# Patient Record
Sex: FEMALE | Race: BLACK OR AFRICAN AMERICAN | NOT HISPANIC OR LATINO | Employment: FULL TIME | URBAN - METROPOLITAN AREA
[De-identification: names, ages, dates, MRNs, and addresses within clinical notes are randomized per-mention and may not be internally consistent; named-entity substitution may affect disease eponyms.]

---

## 2023-02-01 PROBLEM — E55.9 VITAMIN D DEFICIENCY: Status: ACTIVE | Noted: 2023-02-01

## 2023-02-01 PROBLEM — E87.6 LOW SERUM POTASSIUM: Status: ACTIVE | Noted: 2023-02-01

## 2023-02-01 PROBLEM — R92.8 ABNORMAL MAMMOGRAM: Status: ACTIVE | Noted: 2023-02-01

## 2023-02-01 PROBLEM — E66.812 CLASS 2 SEVERE OBESITY WITH SERIOUS COMORBIDITY AND BODY MASS INDEX (BMI) OF 35.0 TO 35.9 IN ADULT: Status: ACTIVE | Noted: 2023-02-01

## 2023-02-01 PROBLEM — E66.01 CLASS 2 SEVERE OBESITY WITH SERIOUS COMORBIDITY AND BODY MASS INDEX (BMI) OF 35.0 TO 35.9 IN ADULT (MULTI): Status: ACTIVE | Noted: 2023-02-01

## 2023-02-01 PROBLEM — R63.8 INCREASED BMI (BODY MASS INDEX): Status: ACTIVE | Noted: 2023-02-01

## 2023-02-01 PROBLEM — J02.0 PHARYNGITIS, STREPTOCOCCAL: Status: ACTIVE | Noted: 2023-02-01

## 2023-02-01 PROBLEM — E04.9 THYROID ENLARGED: Status: ACTIVE | Noted: 2023-02-01

## 2023-02-01 PROBLEM — E88.810 METABOLIC SYNDROME: Status: ACTIVE | Noted: 2023-02-01

## 2023-02-01 PROBLEM — M67.439 GANGLION CYST OF WRIST: Status: ACTIVE | Noted: 2023-02-01

## 2023-02-01 PROBLEM — N97.9 FEMALE INFERTILITY: Status: ACTIVE | Noted: 2023-02-01

## 2023-02-01 PROBLEM — N91.2 AMENORRHEA: Status: ACTIVE | Noted: 2023-02-01

## 2023-02-01 PROBLEM — I10 BENIGN ESSENTIAL HTN: Status: ACTIVE | Noted: 2023-02-01

## 2023-02-01 PROBLEM — R53.83 FATIGUE: Status: ACTIVE | Noted: 2023-02-01

## 2023-02-01 PROBLEM — R03.0 BLOOD PRESSURE ELEVATED WITHOUT HISTORY OF HTN: Status: ACTIVE | Noted: 2023-02-01

## 2023-02-01 RX ORDER — BETAMETHASONE DIPROPIONATE 0.5 MG/G
OINTMENT TOPICAL
COMMUNITY
Start: 2021-10-25 | End: 2024-03-22 | Stop reason: ALTCHOICE

## 2023-02-01 RX ORDER — AMLODIPINE BESYLATE 2.5 MG/1
1 TABLET ORAL DAILY
COMMUNITY
Start: 2022-10-18 | End: 2023-05-02 | Stop reason: SDUPTHER

## 2023-02-01 RX ORDER — CHOLECALCIFEROL (VITAMIN D3) 25 MCG
TABLET ORAL
COMMUNITY
Start: 2022-04-20

## 2023-02-01 RX ORDER — BIOTIN 10 MG
TABLET ORAL
COMMUNITY

## 2023-03-15 ENCOUNTER — OFFICE VISIT (OUTPATIENT)
Dept: PRIMARY CARE | Facility: CLINIC | Age: 43
End: 2023-03-15
Payer: COMMERCIAL

## 2023-03-15 ENCOUNTER — LAB (OUTPATIENT)
Dept: LAB | Facility: LAB | Age: 43
End: 2023-03-15
Payer: COMMERCIAL

## 2023-03-15 VITALS
SYSTOLIC BLOOD PRESSURE: 136 MMHG | BODY MASS INDEX: 36.49 KG/M2 | HEART RATE: 83 BPM | OXYGEN SATURATION: 100 % | RESPIRATION RATE: 18 BRPM | WEIGHT: 206 LBS | DIASTOLIC BLOOD PRESSURE: 86 MMHG

## 2023-03-15 DIAGNOSIS — Z00.00 ANNUAL PHYSICAL EXAM: ICD-10-CM

## 2023-03-15 DIAGNOSIS — E87.6 POTASSIUM (K) DEFICIENCY: ICD-10-CM

## 2023-03-15 DIAGNOSIS — E66.9 OBESITY (BMI 35.0-39.9 WITHOUT COMORBIDITY): ICD-10-CM

## 2023-03-15 DIAGNOSIS — Z00.00 ANNUAL PHYSICAL EXAM: Primary | ICD-10-CM

## 2023-03-15 LAB
ANION GAP IN SER/PLAS: 11 MMOL/L (ref 10–20)
CALCIUM (MG/DL) IN SER/PLAS: 8.9 MG/DL (ref 8.6–10.3)
CARBON DIOXIDE, TOTAL (MMOL/L) IN SER/PLAS: 25 MMOL/L (ref 21–32)
CHLORIDE (MMOL/L) IN SER/PLAS: 104 MMOL/L (ref 98–107)
CREATININE (MG/DL) IN SER/PLAS: 0.91 MG/DL (ref 0.5–1.05)
GFR FEMALE: 79 ML/MIN/1.73M2
GLUCOSE (MG/DL) IN SER/PLAS: 82 MG/DL (ref 74–99)
POTASSIUM (MMOL/L) IN SER/PLAS: 4.4 MMOL/L (ref 3.5–5.3)
SODIUM (MMOL/L) IN SER/PLAS: 136 MMOL/L (ref 136–145)
THYROTROPIN (MIU/L) IN SER/PLAS BY DETECTION LIMIT <= 0.05 MIU/L: 0.93 MIU/L (ref 0.44–3.98)
UREA NITROGEN (MG/DL) IN SER/PLAS: 12 MG/DL (ref 6–23)

## 2023-03-15 PROCEDURE — 99213 OFFICE O/P EST LOW 20 MIN: CPT | Performed by: INTERNAL MEDICINE

## 2023-03-15 PROCEDURE — 80048 BASIC METABOLIC PNL TOTAL CA: CPT

## 2023-03-15 PROCEDURE — 84443 ASSAY THYROID STIM HORMONE: CPT

## 2023-03-15 PROCEDURE — 3075F SYST BP GE 130 - 139MM HG: CPT | Performed by: INTERNAL MEDICINE

## 2023-03-15 PROCEDURE — 3079F DIAST BP 80-89 MM HG: CPT | Performed by: INTERNAL MEDICINE

## 2023-03-15 PROCEDURE — 36415 COLL VENOUS BLD VENIPUNCTURE: CPT

## 2023-03-15 ASSESSMENT — ENCOUNTER SYMPTOMS
ALLERGIC/IMMUNOLOGIC NEGATIVE: 1
NEUROLOGICAL NEGATIVE: 1
PSYCHIATRIC NEGATIVE: 1
EYES NEGATIVE: 1
HEMATOLOGIC/LYMPHATIC NEGATIVE: 1
CONSTITUTIONAL NEGATIVE: 1
RESPIRATORY NEGATIVE: 1
ENDOCRINE NEGATIVE: 1
CARDIOVASCULAR NEGATIVE: 1
GASTROINTESTINAL NEGATIVE: 1
MUSCULOSKELETAL NEGATIVE: 1

## 2023-03-15 NOTE — PROGRESS NOTES
Subjective   Patient ID: Soheila Hassan is a 42 y.o. female who presents for  Elevated BMI -    BMI=36    Doing Well            HPI     Review of Systems   Constitutional: Negative.    HENT: Negative.     Eyes: Negative.    Respiratory: Negative.     Cardiovascular: Negative.    Gastrointestinal: Negative.    Endocrine: Negative.    Genitourinary: Negative.    Musculoskeletal: Negative.    Skin: Negative.    Allergic/Immunologic: Negative.    Neurological: Negative.    Hematological: Negative.    Psychiatric/Behavioral: Negative.         Objective   Pulse 83   Resp 18   Wt 93.4 kg (206 lb)   SpO2 100%   BMI 36.49 kg/m²     Physical Exam  Constitutional:       Appearance: Normal appearance.   HENT:      Head: Normocephalic and atraumatic.      Right Ear: Tympanic membrane, ear canal and external ear normal.      Left Ear: Tympanic membrane, ear canal and external ear normal.      Nose: Nose normal.      Mouth/Throat:      Mouth: Mucous membranes are moist.   Eyes:      Extraocular Movements: Extraocular movements intact.      Conjunctiva/sclera: Conjunctivae normal.      Pupils: Pupils are equal, round, and reactive to light.   Cardiovascular:      Rate and Rhythm: Normal rate and regular rhythm.      Pulses: Normal pulses.      Heart sounds: Normal heart sounds.   Pulmonary:      Effort: Pulmonary effort is normal.      Breath sounds: Normal breath sounds.   Abdominal:      General: Abdomen is flat. Bowel sounds are normal.      Palpations: Abdomen is soft.   Genitourinary:     Rectum: Normal.   Musculoskeletal:         General: Normal range of motion.      Cervical back: Normal range of motion.   Skin:     General: Skin is warm.   Neurological:      General: No focal deficit present.      Mental Status: She is alert and oriented to person, place, and time.   Psychiatric:         Mood and Affect: Mood normal.         Behavior: Behavior normal.         Assessment/Plan         Tighten diet/ Increased physical  activity    Recommend Endocrinology consult for obesity management    HTN    MARIA LUISA < 2000 MG/day    Continue with Amlodipine 2.5 mg daily    Continue with Current treatment    Follow up in 3 months/PRN

## 2023-05-02 DIAGNOSIS — I10 PRIMARY HYPERTENSION: Primary | ICD-10-CM

## 2023-05-02 RX ORDER — AMLODIPINE BESYLATE 2.5 MG/1
2.5 TABLET ORAL DAILY
Qty: 90 TABLET | Refills: 3 | Status: SHIPPED | OUTPATIENT
Start: 2023-05-02 | End: 2023-06-22 | Stop reason: DRUGHIGH

## 2023-06-22 ENCOUNTER — OFFICE VISIT (OUTPATIENT)
Dept: PRIMARY CARE | Facility: CLINIC | Age: 43
End: 2023-06-22
Payer: COMMERCIAL

## 2023-06-22 VITALS
RESPIRATION RATE: 18 BRPM | OXYGEN SATURATION: 99 % | HEART RATE: 73 BPM | WEIGHT: 207.6 LBS | DIASTOLIC BLOOD PRESSURE: 83 MMHG | SYSTOLIC BLOOD PRESSURE: 140 MMHG | BODY MASS INDEX: 36.77 KG/M2

## 2023-06-22 DIAGNOSIS — I10 BENIGN ESSENTIAL HTN: Primary | ICD-10-CM

## 2023-06-22 PROCEDURE — 3079F DIAST BP 80-89 MM HG: CPT | Performed by: INTERNAL MEDICINE

## 2023-06-22 PROCEDURE — 3077F SYST BP >= 140 MM HG: CPT | Performed by: INTERNAL MEDICINE

## 2023-06-22 PROCEDURE — 99213 OFFICE O/P EST LOW 20 MIN: CPT | Performed by: INTERNAL MEDICINE

## 2023-06-22 PROCEDURE — 1036F TOBACCO NON-USER: CPT | Performed by: INTERNAL MEDICINE

## 2023-06-22 RX ORDER — POTASSIUM CHLORIDE 750 MG/1
1 CAPSULE, EXTENDED RELEASE ORAL DAILY
COMMUNITY
Start: 2022-08-23 | End: 2023-06-22 | Stop reason: ALTCHOICE

## 2023-06-22 RX ORDER — AMLODIPINE BESYLATE 5 MG/1
5 TABLET ORAL DAILY
Qty: 90 TABLET | Refills: 3 | Status: SHIPPED | OUTPATIENT
Start: 2023-06-22 | End: 2024-03-22 | Stop reason: SDUPTHER

## 2023-06-22 RX ORDER — TOPIRAMATE 100 MG/1
100 TABLET, FILM COATED ORAL 2 TIMES DAILY
COMMUNITY
Start: 2023-06-14 | End: 2024-03-22 | Stop reason: ALTCHOICE

## 2023-06-22 ASSESSMENT — PATIENT HEALTH QUESTIONNAIRE - PHQ9
1. LITTLE INTEREST OR PLEASURE IN DOING THINGS: NOT AT ALL
SUM OF ALL RESPONSES TO PHQ9 QUESTIONS 1 AND 2: 0
2. FEELING DOWN, DEPRESSED OR HOPELESS: NOT AT ALL

## 2023-06-22 ASSESSMENT — ENCOUNTER SYMPTOMS
DEPRESSION: 0
LOSS OF SENSATION IN FEET: 0
OCCASIONAL FEELINGS OF UNSTEADINESS: 0

## 2023-09-25 ENCOUNTER — APPOINTMENT (OUTPATIENT)
Dept: PRIMARY CARE | Facility: CLINIC | Age: 43
End: 2023-09-25
Payer: COMMERCIAL

## 2023-09-25 RX ORDER — CHLORTHALIDONE 25 MG/1
1 TABLET ORAL DAILY
COMMUNITY
Start: 2022-09-28 | End: 2024-03-22 | Stop reason: ALTCHOICE

## 2023-09-27 ENCOUNTER — OFFICE VISIT (OUTPATIENT)
Dept: PRIMARY CARE | Facility: CLINIC | Age: 43
End: 2023-09-27
Payer: COMMERCIAL

## 2023-09-27 VITALS
HEIGHT: 63 IN | SYSTOLIC BLOOD PRESSURE: 126 MMHG | RESPIRATION RATE: 16 BRPM | OXYGEN SATURATION: 99 % | BODY MASS INDEX: 35.97 KG/M2 | WEIGHT: 203 LBS | HEART RATE: 82 BPM | DIASTOLIC BLOOD PRESSURE: 84 MMHG

## 2023-09-27 DIAGNOSIS — R05.8 UPPER AIRWAY COUGH SYNDROME: Primary | ICD-10-CM

## 2023-09-27 DIAGNOSIS — Z77.120 MOLD EXPOSURE: ICD-10-CM

## 2023-09-27 PROCEDURE — 1036F TOBACCO NON-USER: CPT | Performed by: INTERNAL MEDICINE

## 2023-09-27 PROCEDURE — 3074F SYST BP LT 130 MM HG: CPT | Performed by: INTERNAL MEDICINE

## 2023-09-27 PROCEDURE — 3079F DIAST BP 80-89 MM HG: CPT | Performed by: INTERNAL MEDICINE

## 2023-09-27 PROCEDURE — 99213 OFFICE O/P EST LOW 20 MIN: CPT | Performed by: INTERNAL MEDICINE

## 2023-09-27 RX ORDER — FLUTICASONE PROPIONATE 50 MCG
1 SPRAY, SUSPENSION (ML) NASAL 2 TIMES DAILY
Qty: 16 G | Refills: 1 | Status: SHIPPED | OUTPATIENT
Start: 2023-09-27 | End: 2024-03-22 | Stop reason: ALTCHOICE

## 2023-09-27 NOTE — PROGRESS NOTES
"Subjective   Patient ID: Soheila Hassan is a 43 y.o. female who presents for Follow-up (3 month ).    Cough x 3 months    HPI     PND    Cough            Review of Systems    No Fever/chills/headaches/dizziness/chest pains/ shortness of breath/palpitations/Nausea/vomiting/diarrhea/ constipation/urine frequency/blood in urine.    Objective   Pulse 82   Resp 16   Ht 1.6 m (5' 3\")   Wt 92.1 kg (203 lb)   SpO2 99%   BMI 35.96 kg/m²     Physical Exam    No JVP elevation. No palpable Lymph Nodes. No Thyromegaly    HEENT-Neg    CVS-NL S1/S2 . No MRG    Lungs-CTA. B/S= B/L    Abdomen-Soft, Non-tender. No masses or HSM    Extremities: No C/C/E      Assessment/Plan     PND    Cough    Upper Airway cough syndrome      Infection Vs Inflammation Vs Allergic    Plan:    CXR    Fluticasone- 1 spray per nostril BID    ? Sinus CT    ? Allergy/ENT    Follow up/ Call with any concerns       "

## 2023-10-02 ENCOUNTER — TELEPHONE (OUTPATIENT)
Dept: PRIMARY CARE | Facility: CLINIC | Age: 43
End: 2023-10-02
Payer: COMMERCIAL

## 2023-10-02 NOTE — TELEPHONE ENCOUNTER
Spoke to patient and informed her of rmb message about her results. She was in full understanding and had no other questions or concerns.       Rema Oakley MA

## 2024-03-22 ENCOUNTER — OFFICE VISIT (OUTPATIENT)
Dept: PRIMARY CARE | Facility: CLINIC | Age: 44
End: 2024-03-22
Payer: COMMERCIAL

## 2024-03-22 VITALS
HEART RATE: 73 BPM | BODY MASS INDEX: 34.05 KG/M2 | WEIGHT: 192.2 LBS | RESPIRATION RATE: 18 BRPM | DIASTOLIC BLOOD PRESSURE: 88 MMHG | OXYGEN SATURATION: 99 % | SYSTOLIC BLOOD PRESSURE: 124 MMHG | HEIGHT: 63 IN

## 2024-03-22 DIAGNOSIS — E66.9 CLASS 1 OBESITY WITH SERIOUS COMORBIDITY AND BODY MASS INDEX (BMI) OF 34.0 TO 34.9 IN ADULT, UNSPECIFIED OBESITY TYPE: ICD-10-CM

## 2024-03-22 DIAGNOSIS — Z12.11 ENCOUNTER FOR SCREENING COLONOSCOPY: ICD-10-CM

## 2024-03-22 DIAGNOSIS — Z00.00 ROUTINE GENERAL MEDICAL EXAMINATION AT A HEALTH CARE FACILITY: Primary | ICD-10-CM

## 2024-03-22 DIAGNOSIS — Z12.39 BREAST SCREENING: ICD-10-CM

## 2024-03-22 DIAGNOSIS — I10 BENIGN ESSENTIAL HTN: ICD-10-CM

## 2024-03-22 PROCEDURE — 99396 PREV VISIT EST AGE 40-64: CPT | Performed by: INTERNAL MEDICINE

## 2024-03-22 PROCEDURE — 1036F TOBACCO NON-USER: CPT | Performed by: INTERNAL MEDICINE

## 2024-03-22 PROCEDURE — 3008F BODY MASS INDEX DOCD: CPT | Performed by: INTERNAL MEDICINE

## 2024-03-22 PROCEDURE — 3079F DIAST BP 80-89 MM HG: CPT | Performed by: INTERNAL MEDICINE

## 2024-03-22 PROCEDURE — 3074F SYST BP LT 130 MM HG: CPT | Performed by: INTERNAL MEDICINE

## 2024-03-22 RX ORDER — AMLODIPINE BESYLATE 5 MG/1
5 TABLET ORAL DAILY
Qty: 90 TABLET | Refills: 3 | Status: SHIPPED | OUTPATIENT
Start: 2024-03-22 | End: 2025-03-22

## 2024-03-22 RX ORDER — SEMAGLUTIDE 0.25 MG/.5ML
0.25 INJECTION, SOLUTION SUBCUTANEOUS
Qty: 2 ML | Refills: 0 | Status: SHIPPED | OUTPATIENT
Start: 2024-03-22 | End: 2024-04-13

## 2024-03-22 ASSESSMENT — PATIENT HEALTH QUESTIONNAIRE - PHQ9
SUM OF ALL RESPONSES TO PHQ9 QUESTIONS 1 AND 2: 0
2. FEELING DOWN, DEPRESSED OR HOPELESS: NOT AT ALL
1. LITTLE INTEREST OR PLEASURE IN DOING THINGS: NOT AT ALL

## 2024-03-22 ASSESSMENT — ENCOUNTER SYMPTOMS
OCCASIONAL FEELINGS OF UNSTEADINESS: 0
DEPRESSION: 0
LOSS OF SENSATION IN FEET: 0

## 2024-03-22 NOTE — PROGRESS NOTES
"Subjective   Patient ID: Soheila Hassan is a 43 y.o. female who presents for Annual Exam.    Doing well-daughter published her first book in 2/24-\"Estrella goes to school\"    CPE :    HTN    Asthma    Obesity    HPI     Review of Systems      No Fever/chills/headaches/dizziness/chest pains/ cough/ shortness of breath/palpitations/ abdominal pain /Nausea/vomiting/diarrhea/ constipation/urine frequency/blood in urine.      Objective   Pulse 73   Resp 18   Ht 1.59 m (5' 2.6\")   Wt 87.2 kg (192 lb 3.2 oz)   SpO2 99%   BMI 34.49 kg/m²     Physical Exam      No JVP elevation. No palpable Lymph Nodes. No Thyromegaly    HEENT- Negative    CVS-NL S1/S2 . No MRG    Lungs-CTA. B/S= B/L    Abdomen-Soft, Non-tender. No masses or HSM    Extremities: No C/C/E    Skin-No abnormal moles/rash        Assessment/Plan     HTN    Asthma    Obesity    Plan:    Continue with amlodipine 10 mg daily.  Consider adding diuretic if BP > 130/80    Discussed GLP-1 agonists such as Wegovy and Zepbound for weight loss-benefits and side effects discussed with patient's.  Will submit to insurance for PA if necessary.    CRC    Screening mammogram    Follow up 3 months/PRN      "

## 2024-04-01 ENCOUNTER — TELEPHONE (OUTPATIENT)
Dept: PRIMARY CARE | Facility: CLINIC | Age: 44
End: 2024-04-01
Payer: COMMERCIAL

## 2024-04-02 ENCOUNTER — HOSPITAL ENCOUNTER (OUTPATIENT)
Dept: RADIOLOGY | Facility: CLINIC | Age: 44
Discharge: HOME | End: 2024-04-02
Payer: COMMERCIAL

## 2024-04-02 VITALS — BODY MASS INDEX: 34.06 KG/M2 | WEIGHT: 192.24 LBS | HEIGHT: 63 IN

## 2024-04-02 DIAGNOSIS — Z12.39 BREAST SCREENING: ICD-10-CM

## 2024-04-02 PROCEDURE — 77067 SCR MAMMO BI INCL CAD: CPT | Performed by: RADIOLOGY

## 2024-04-02 PROCEDURE — 77063 BREAST TOMOSYNTHESIS BI: CPT | Performed by: RADIOLOGY

## 2024-04-02 PROCEDURE — 77067 SCR MAMMO BI INCL CAD: CPT

## 2024-09-11 ENCOUNTER — APPOINTMENT (OUTPATIENT)
Dept: PRIMARY CARE | Facility: CLINIC | Age: 44
End: 2024-09-11
Payer: COMMERCIAL

## 2025-01-30 ENCOUNTER — HOSPITAL ENCOUNTER (OUTPATIENT)
Dept: GASTROENTEROLOGY | Facility: HOSPITAL | Age: 45
Discharge: HOME | End: 2025-01-30
Payer: COMMERCIAL

## 2025-01-30 VITALS
HEIGHT: 63 IN | BODY MASS INDEX: 34.38 KG/M2 | DIASTOLIC BLOOD PRESSURE: 69 MMHG | WEIGHT: 194 LBS | RESPIRATION RATE: 16 BRPM | TEMPERATURE: 96.8 F | HEART RATE: 92 BPM | SYSTOLIC BLOOD PRESSURE: 102 MMHG | OXYGEN SATURATION: 97 %

## 2025-01-30 DIAGNOSIS — Z12.11 ENCOUNTER FOR SCREENING COLONOSCOPY: Primary | ICD-10-CM

## 2025-01-30 LAB — PREGNANCY TEST URINE, POC: NEGATIVE

## 2025-01-30 PROCEDURE — 81025 URINE PREGNANCY TEST: CPT | Performed by: INTERNAL MEDICINE

## 2025-01-30 PROCEDURE — 7100000010 HC PHASE TWO TIME - EACH INCREMENTAL 1 MINUTE

## 2025-01-30 PROCEDURE — 2500000004 HC RX 250 GENERAL PHARMACY W/ HCPCS (ALT 636 FOR OP/ED): Performed by: INTERNAL MEDICINE

## 2025-01-30 PROCEDURE — 45378 DIAGNOSTIC COLONOSCOPY: CPT | Performed by: INTERNAL MEDICINE

## 2025-01-30 PROCEDURE — 7100000009 HC PHASE TWO TIME - INITIAL BASE CHARGE

## 2025-01-30 RX ORDER — MIDAZOLAM HYDROCHLORIDE 1 MG/ML
INJECTION INTRAMUSCULAR; INTRAVENOUS AS NEEDED
Status: COMPLETED | OUTPATIENT
Start: 2025-01-30 | End: 2025-01-30

## 2025-01-30 RX ADMIN — MEPERIDINE HYDROCHLORIDE 25 MG: 25 INJECTION, SOLUTION INTRAMUSCULAR; INTRAVENOUS; SUBCUTANEOUS at 13:44

## 2025-01-30 RX ADMIN — MIDAZOLAM HYDROCHLORIDE 1 MG: 1 INJECTION, SOLUTION INTRAMUSCULAR; INTRAVENOUS at 13:44

## 2025-01-30 RX ADMIN — MEPERIDINE HYDROCHLORIDE 50 MG: 25 INJECTION, SOLUTION INTRAMUSCULAR; INTRAVENOUS; SUBCUTANEOUS at 13:38

## 2025-01-30 RX ADMIN — MIDAZOLAM HYDROCHLORIDE 2 MG: 1 INJECTION, SOLUTION INTRAMUSCULAR; INTRAVENOUS at 13:38

## 2025-01-30 ASSESSMENT — PAIN SCALES - GENERAL
PAINLEVEL_OUTOF10: 0 - NO PAIN

## 2025-01-30 ASSESSMENT — PAIN - FUNCTIONAL ASSESSMENT: PAIN_FUNCTIONAL_ASSESSMENT: 0-10

## 2025-01-30 ASSESSMENT — COLUMBIA-SUICIDE SEVERITY RATING SCALE - C-SSRS
1. IN THE PAST MONTH, HAVE YOU WISHED YOU WERE DEAD OR WISHED YOU COULD GO TO SLEEP AND NOT WAKE UP?: NO
6. HAVE YOU EVER DONE ANYTHING, STARTED TO DO ANYTHING, OR PREPARED TO DO ANYTHING TO END YOUR LIFE?: NO
2. HAVE YOU ACTUALLY HAD ANY THOUGHTS OF KILLING YOURSELF?: NO

## 2025-01-30 NOTE — SIGNIFICANT EVENT
Patient arrived to [facility] after procedure with procedure RN, procedure discussed, plan reviewed, VSS

## 2025-01-30 NOTE — H&P
History Of Present Illness  Soheila Hassan is a 44 y.o. female presenting with screening.     Past Medical History  Past Medical History:   Diagnosis Date    Encounter for other general counseling and advice on procreation 2017    Pre-conception counseling    Encounter for preprocedural laboratory examination 2017    Pre-procedure lab exam    Other specified health status     Known health problems: none    Other symptoms and signs concerning food and fluid intake 2020    Increased BMI (body mass index)    Personal history of other diseases of the digestive system 2016    History of acute cholecystitis    Personal history of other diseases of the respiratory system 10/23/2018    History of sinusitis    Personal history of other drug therapy 10/23/2018    History of drug therapy     Surgical History  Past Surgical History:   Procedure Laterality Date     SECTION, CLASSIC      CYST REMOVAL      GALLBLADDER SURGERY      OTHER SURGICAL HISTORY  2015    Gynec[facility] Services In Vitro Fertilization    OTHER SURGICAL HISTORY  2016    Laparoscopic Cholecystectomy With Cholangiography    REFRACTIVE SURGERY  2014    Corneal LASIK     Social History  She reports that she has never smoked. She has never used smokeless tobacco. She reports current alcohol use of about 2.0 standard drinks of alcohol per week. She reports that she does not use drugs.    Family History  Family History   Problem Relation Name Age of Onset    Lung cancer Mother      COPD Father      Stroke Other grandparent         Allergies  No Known Allergies  Review of Systems  Pre-sedation Evaluation:  ASA Classification - ASA 2 - Patient with mild systemic disease with no functional limitations  Mallampati Score - II (hard and soft palate, upper portion of tonsils and uvula visible)    Physical Exam  Vitals reviewed.   Constitutional:       Appearance: Normal appearance.   Cardiovascular:      Rate and Rhythm:  "Normal rate and regular rhythm.   Pulmonary:      Effort: Pulmonary effort is normal.      Breath sounds: Normal breath sounds.   Neurological:      Mental Status: She is alert.          Last Recorded Vitals  Blood pressure 134/87, pulse 88, temperature 36.2 °C (97.2 °F), temperature source Temporal, resp. rate 18, height 1.6 m (5' 3\"), weight 88 kg (194 lb 0.1 oz), last menstrual period 01/27/2025, SpO2 99%.     Assessment/Plan   R/O neoplasm for colonoscopy     PTA/Current Medications:  (Not in a hospital admission)    Current Outpatient Medications   Medication Sig Dispense Refill    amLODIPine (Norvasc) 5 mg tablet Take 1 tablet (5 mg) by mouth once daily. 90 tablet 3    biotin 10 mg tablet Take by mouth. (Patient not taking: Reported on 1/30/2025)      cholecalciferol (Vitamin D-3) 25 MCG (1000 UT) tablet Take by mouth. (Patient not taking: Reported on 1/30/2025)      mv-min/iron/folic/calcium/vitK (WOMEN'S MULTIVITAMIN ORAL) Take by mouth. (Patient not taking: Reported on 1/30/2025)      semaglutide, weight loss, (Wegovy) 0.25 mg/0.5 mL pen injector Inject 0.25 mg under the skin 1 (one) time per week for 4 doses. (Patient not taking: Reported on 1/30/2025) 2 mL 0    zinc acetate 25 mg (zinc) capsule Take by mouth. (Patient not taking: Reported on 1/30/2025)       No current facility-administered medications for this encounter.     Ravi South MD  "

## 2025-01-30 NOTE — DISCHARGE INSTRUCTIONS
Patient Instructions after a Colonoscopy      The anesthetics, sedatives or narcotics which were given to you today will be acting in your body for the next 24 hours, so you might feel a little sleepy or groggy.  This feeling should slowly wear off. Carefully read and follow the instructions.     You received sedation today:  - Do not drive or operate any machinery or power tools of any kind.   - No alcoholic beverages today, not even beer or wine.  - Do not make any important decisions or sign any legal documents.  - No over the counter medications that contain alcohol or that may cause drowsiness.  - Do not make any important decisions or sign any legal documents.  - Make sure you have someone with you for first 24 hours.    While it is common to experience mild to moderate abdominal distention, gas, or belching after your procedure, if any of these symptoms occur following discharge from the GI Lab or within one week of having your procedure, call the [facility] to be advised whether a visit to your nearest Urgent Care or Emergency Department is indicated.  Take this paper with you if you go.     - If you develop an allergic reaction to the medications that were given during your procedure such as difficulty breathing, rash, hives, severe nausea, vomiting or lightheadedness.  - If you experience chest pain, shortness of breath, severe abdominal pain, fevers and chills.  -If you develop signs and symptoms of bleeding such as blood in your spit, if your stools turn black, tarry, or bloody  - If you have not urinated within 8 hours following your procedure.  - If your IV site becomes painful, red, inflamed, or looks infected.    If you received a biopsy/polypectomy/sphincterotomy the following instructions apply below:    __ Do not use Aspirin containing products, non-steroidal medications or anti-coagulants for one week following your procedure. (Examples of these types of medications are: Advil,  Arthrotec, Aleve, Coumadin, Ecotrin, Heparin, Ibuprofen, Indocin, Motrin, Naprosyn, Nuprin, Plavix, Vioxx, and Voltarin, or their generic forms.  This list is not all-inclusive.  Check with your physician or pharmacist before resuming medications.)   __ Eat a soft diet today.  Avoid foods that are poorly digested for the next 24 hours.  These foods would include: nuts, beans, lettuce, red meats, and fried foods. Start with liquids and advance your diet as tolerated, gradually work up to eating solids.   __ Do not have a Barium Study or Enema for one week.    Your physician recommends the additional following instructions:    -You have a contact number available for emergencies. The signs and symptoms of potential delayed complications were discussed with you. You may return to normal activities tomorrow.  -Resume your previous diet.  -Continue your present medications.   -We are waiting for your pathology results.  -Your physician has recommended a repeat colonoscopy (date to be determined after pending pathology results are reviewed) for surveillance based on pathology results.  -The findings and recommendations have been discussed with you.  -The findings and recommendations were discussed with your family.  - Please see Medication Reconciliation Form for new medication/medications prescribed.       If you experience any problems or have any questions following discharge from the GI Lab, please call:    Ravi South MD  [phone]    To reach your physician after hours call [phone] and ask for the GI Fellow on call      Nurse Signature                                                                        Date___________________                                                                            Patient/Responsible Party Signature                                        Date___________________

## 2025-01-31 ASSESSMENT — PAIN SCALES - GENERAL: PAINLEVEL_OUTOF10: 0 - NO PAIN

## 2025-01-31 NOTE — ADDENDUM NOTE
Encounter addended by: Meredith Biggs RN on: 1/31/2025 10:04 AM   Actions taken: Contacts section saved, Flowsheet accepted

## 2025-05-21 ENCOUNTER — TELEPHONE (OUTPATIENT)
Facility: CLINIC | Age: 45
End: 2025-05-21
Payer: COMMERCIAL

## 2025-05-21 DIAGNOSIS — R45.86 MOOD SWINGS: ICD-10-CM

## 2025-05-21 DIAGNOSIS — R63.5 WEIGHT GAIN: ICD-10-CM

## 2025-05-21 DIAGNOSIS — R23.2 HOT FLASHES: ICD-10-CM

## 2025-06-02 DIAGNOSIS — I10 BENIGN ESSENTIAL HTN: ICD-10-CM

## 2025-06-02 RX ORDER — AMLODIPINE BESYLATE 5 MG/1
5 TABLET ORAL DAILY
Qty: 90 TABLET | Refills: 3 | Status: SHIPPED | OUTPATIENT
Start: 2025-06-02 | End: 2026-06-02

## 2025-06-05 LAB
ESTRADIOL SERPL-MCNC: 26 PG/ML
FSH SERPL-ACNC: 83.3 MIU/ML
LH SERPL-ACNC: 60.2 MIU/ML

## 2025-06-06 ENCOUNTER — TELEPHONE (OUTPATIENT)
Facility: CLINIC | Age: 45
End: 2025-06-06
Payer: COMMERCIAL

## 2025-06-09 ENCOUNTER — TELEPHONE (OUTPATIENT)
Dept: PRIMARY CARE | Facility: CLINIC | Age: 45
End: 2025-06-09
Payer: COMMERCIAL

## 2025-06-09 NOTE — TELEPHONE ENCOUNTER
Labs with elevated LH/FSH c/w Menopause. Pt os c/o nightsweats and weight gain. Recommend Gyn appt with Dr Alfonso or Victorino Borden, NP

## 2025-06-11 ENCOUNTER — APPOINTMENT (OUTPATIENT)
Dept: OBSTETRICS AND GYNECOLOGY | Facility: CLINIC | Age: 45
End: 2025-06-11
Payer: COMMERCIAL

## 2025-06-18 ENCOUNTER — APPOINTMENT (OUTPATIENT)
Dept: OBSTETRICS AND GYNECOLOGY | Facility: CLINIC | Age: 45
End: 2025-06-18
Payer: COMMERCIAL

## 2025-06-18 VITALS — SYSTOLIC BLOOD PRESSURE: 128 MMHG | BODY MASS INDEX: 37.8 KG/M2 | DIASTOLIC BLOOD PRESSURE: 80 MMHG | WEIGHT: 213.4 LBS

## 2025-06-18 DIAGNOSIS — N95.1 PERIMENOPAUSAL: ICD-10-CM

## 2025-06-18 DIAGNOSIS — N95.8 GENITOURINARY SYNDROME OF MENOPAUSE: ICD-10-CM

## 2025-06-18 DIAGNOSIS — G47.09 TROUBLE GETTING TO SLEEP: ICD-10-CM

## 2025-06-18 DIAGNOSIS — R53.83 LACK OF ENERGY: ICD-10-CM

## 2025-06-18 DIAGNOSIS — R45.86 MOOD SWINGS: ICD-10-CM

## 2025-06-18 DIAGNOSIS — N95.1 HOT FLASH, MENOPAUSAL: Primary | ICD-10-CM

## 2025-06-18 DIAGNOSIS — R45.4 IRRITABILITY: ICD-10-CM

## 2025-06-18 DIAGNOSIS — F52.0 HYPOACTIVE SEXUAL DESIRE DISORDER: ICD-10-CM

## 2025-06-18 DIAGNOSIS — N92.6 IRREGULAR MENSES: ICD-10-CM

## 2025-06-18 DIAGNOSIS — R63.5 WEIGHT GAIN: ICD-10-CM

## 2025-06-18 DIAGNOSIS — R61 NIGHT SWEATS: ICD-10-CM

## 2025-06-18 DIAGNOSIS — R41.3 MEMORY CHANGES: ICD-10-CM

## 2025-06-18 PROCEDURE — 3074F SYST BP LT 130 MM HG: CPT | Performed by: OBSTETRICS & GYNECOLOGY

## 2025-06-18 PROCEDURE — 1036F TOBACCO NON-USER: CPT | Performed by: OBSTETRICS & GYNECOLOGY

## 2025-06-18 PROCEDURE — 3079F DIAST BP 80-89 MM HG: CPT | Performed by: OBSTETRICS & GYNECOLOGY

## 2025-06-18 PROCEDURE — 99204 OFFICE O/P NEW MOD 45 MIN: CPT | Performed by: OBSTETRICS & GYNECOLOGY

## 2025-06-18 RX ORDER — ESTRADIOL 0.07 MG/D
1 FILM, EXTENDED RELEASE TRANSDERMAL 2 TIMES WEEKLY
Qty: 24 PATCH | Refills: 3 | Status: SHIPPED | OUTPATIENT
Start: 2025-06-19 | End: 2026-06-19

## 2025-06-18 RX ORDER — PROGESTERONE 100 MG/1
100 CAPSULE ORAL NIGHTLY
Qty: 90 CAPSULE | Refills: 3 | Status: SHIPPED | OUTPATIENT
Start: 2025-06-18 | End: 2026-06-18

## 2025-06-18 ASSESSMENT — PATIENT HEALTH QUESTIONNAIRE - PHQ9
2. FEELING DOWN, DEPRESSED OR HOPELESS: NOT AT ALL
SUM OF ALL RESPONSES TO PHQ9 QUESTIONS 1 AND 2: 0
1. LITTLE INTEREST OR PLEASURE IN DOING THINGS: NOT AT ALL

## 2025-06-18 ASSESSMENT — PAIN SCALES - GENERAL: PAINLEVEL_OUTOF10: 0-NO PAIN

## 2025-06-18 NOTE — PATIENT INSTRUCTIONS
METABOLISM:  Weight managment for menopause    In terms of education:  Natural decrease in metabolism with age, levels off after 70's.  And gradual decrease in lean muscle mass with menopause, assoc with decrease in testosterone as well  Estrogen is involved in the bacteria in our intestines (gut lois). After menopause this changes in a way to reduce metabolism. While the studies have NOT been done supporting a probiotic supplement--it makes sense to try one.  The one I recommend based off Dr. Rhonda Dao's video (below link) is:    Reviewed relationship of sleep disturbance and increased cortisol and how this will decrease metabolism too (eg. Fight or flight process has high cortisol and decreased metabolism to conserve energy when needed)  Reducing stress--meditation and yoga and CBT can help reduce cortisol and improve sleep and metabolism  Reviewed that HRT does not necessarily prevent the weight gain but that studies show women on HRT have an easier time maintaining their weight and overall have less weight gain compared to women NOT on HRT      KEY: to lose weight must decrease calories (safely) and change what you eat  To keep weight off -MUST add exercise  Increasing muscle mass=increased metabolism (muscle has more metabolic needs than other tissues)  Reviewed 250 min of mod intense exercise /wk  Reviewed weight training and resistance training  [employer] has good youtube videos for weight training             Recommend total calorie intake 1200 kcal  Mediterranean diet-ideal    The general guidelines of the  diet recommend that people eat:  a wide variety of vegetables, fruits, and whole grains, (oat, grains--NOT bread)  5-6 servings of veggies (fresh or frozen) some fruit (remember fruit has anti-oxidants and vitamins but high in sugar)  Increase protein (1gm.kg body weight goal)  Fish, poultry beans, nuts, pork primarily--cut off excess skin and fat,    lean white meat (and very little red meat)-focus on  lean meats, unprocessed meat  moderate amounts of dairy and fish  No processed food (nothing prepackaged)  healthful fats, such as nuts, seeds, and olive oil.  Watch the fats if possible eliminate 'slime foods'-butter/margarine, mayonnaise, white salad dressing, sour cream  To have sweets-limit portion size and frequency and try to make better choices--dark chocolate over milk, really no difference in ice cream/frozen yogurt/gelato--try to get something homemade to avoid -high fructose corn syrup--this has an addictive property worse than opiates --crave more  There are a lot of hidden bad things in [place] foods.  Eg. High fructose corn syrup in almost all wheat bread (except bakery made and [employer])    10 things that can help boost metabolism (NO evidence to support but unlikely to add excess calories or cause harm):  1-increased protein intake. (goal 1-1.5 mg/kg weight, 1.5 if lifting weights)  2-Caoco powder  3-Cayenne --add spice to food  4-cider vinegar  5-green tea--I recommend Matcha tea--which is very fine powder green tea  6-greek yogurt and lemon  7-papaya  8-have ice cold water first thing in the morning, and more water thru the day  9-arelis  10-caffeine  11-consider protein only for breakfast and no carbs      Things that may help decrease abdominal fat  1-1/2 tsp baking soda with 2 tbsn cider vinegar in cold water first thing in am before anything    I usually recommend this educational video by Dr Rhonda Mullins--internist and nutritionist with a Bariatric clinic in [place]  dr rhonda mullins md you tube - Search Videos

## 2025-06-18 NOTE — PROGRESS NOTES
SUBJECTIVE:  HPI: Soheila Hassan is a No obstetric history on file. who presents for menopause consult    Consultation requested by  self referred for an opinion regarding menopause.       SYMPTOMS  Any sxs from questionnaire:  REVISED MenQOL 2025 sm     REVISED MRS/MenQOL 2025 sm  Which of the following symptoms apply to you at this time? Please haim the appropriate box for each symptom. For symptoms that do not apply, haim '0'.  '0'=does not bother you at all  '1'=mild, '2'=moderate, '3'=severe, '4'=very severe    Hot flashes                                          No     Yes           à               1   2   3   4   5   Y4     Night sweats                                        No     Yes           à               1   2   3   4   5  Y4      Difficulty falling asleep                         No     Yes           à               1   2   3   4   5    Y2     Difficulty staying asleep                      No     Yes           à               1   2   3   4   5    Y4     Feeling anxious or nervous                 No     Yes           à               1   2   3   4   5    Y1     Experiencing poor memory                 No     Yes           à               1   2   3   4   5    Y3     Feeling depressed, down or blue        No     Yes           à               1   2   3   4   5    Y2     Being impatient with others                No     Yes           à               1   2   3   4   5    Y2     Aching muscles or joints                     No     Yes           à               1   2   3   4   5    Y2     Feeling tired or worn out                    No     Yes           à               1   2   3   4   5    Y3.     Feeling lack of energy                          No     Yes           à               1   2   3   4   5    Y3     Dry skin                                                No     Yes           à               1   2   3   4   5    Y2     Weight gain                                           No     Yes           à                1   2   3   4   5    Y4     Increased facial hair                             No     Yes           à               1   2   3   4   5    Y2     Changes to texture or tone of skin     No     Yes           à               1   2   3   4   5    Y2     Frequent urination                              No     Yes           à               1   2   3   4   5    N     Leaking urine with coughing, laughing No     Yes           à               1   2   3   4   5   N     Unable to hold urine                            No     Yes           à               1   2   3   4   5    N     Vaginal dryness                                   No     Yes           à               1   2   3   4   5    Y3     Changes to sexual desire                    No     Yes           à               1   2   3   4   5    Y3     Discomfort or pain with intercourse   No     Yes           à               1   2   3   4   5    Y3     Constipation                                         No     Yes           à               1   2   3   4   5    Y1     Headache                                            No     Yes           à               1   2   3   4   5    Y1     Hair thinning on head                         No     Yes           à               1   2   3   4   5    Y2      Most bothersome syx: weight   And then VMS and lack of sleep    GSM--usually only notices drynes with sex  Number of years of menopause symptoms (typically just VMS) 1  Diet and exercise (how often, # min, type)    Does cardio 45 min intermittently   walking    TRIED/OFFERED:  Rx/Non-prescription interventions (what additional supplements are they taking and for what)     Tried OTC soraya and not sure if it helped for a little while  But then syx returned        RISK ASSESSMENT (see link for full ROS)    Problem List[1]  ]  Oligomenorrhea  HTN  Fatigue  TH  Vit D        GYN HX/HORMONAL HISTORY:  Routine GYN care clinician: [facility]  Last seen 2024     LMP recorded. 3 mo  Menopause age not  yet    Hysterectomy: no  Ovaries removed: no    Medical History[2]  Surgical History[3]  Problem List[4]    Current Medications[5]      I reviewed, entered and updated pertinent portions of the record including medical, gynecologic, obstetrical, social, family, and surgical history along with active gyn medications list and allergies.    ROS:  See HPI        OBJECTIVE:  /80   Wt 96.8 kg (213 lb 6.4 oz)   BMI 37.80 kg/m²       Labs:  The ASCVD Risk score (John WALDRON, et al., 2019) failed to calculate for the following reasons:    Cannot find a previous HDL lab    Cannot find a previous total cholesterol lab    Unable to determine if patient is Non-             ASSESSMENT  Encounter Diagnoses   Name Primary?    Hot flash, menopausal Yes    Night sweats     Weight gain     Mood swings     Memory changes     Lack of energy     Irritability     Trouble getting to sleep     Genitourinary syndrome of menopause     Hypoactive sexual desire disorder     Irregular menses     Perimenopausal              PLAN  1)    --menopause.  Reviewed average age at menopause, definition of menopause (cessation of ovarian function).  Reviewed common symptons--vaginal dryness, vaginal irritation, frequent urinary pressure/urination sensation, dyspareunia, hot flashes, trouble sleeping, mood swings, irritability, thinning hair, decreased metabolism, forgetfullness.  Also reviewed in depth the changes in sexual function that often accompany menopause--dyspareunia, decreased vaginal sensation, decresaed ability to achieve orgasm, decreased lubrication, decreased mental and physical arousal.  Also discussed menopause and aging, osteoporosis-related to decrease and then lalck of estrogen, heart disease, decrease in skin elasticity.  Referred pt to the [facility] website for patients.    2) The approach to caring for women in menopause is to address all the health changes that you feel as well as those that you may NOT  feel but that are either affecting your body now or will in the future.  The goal would be to optimize your health to prevent medical conditions from developing or progressing.  Since menopause affects women from head to toe, we need a team to take care of every part.  Our team consists of a nutritionists, a gynecologist, a behavioral health therapist and a Family medicine physician.  Sometimes we also refer to other specialists when needed.    #) Reviewed perimenopause.  Phase of menopause symptoms while still having regular menses or irregular menses.  Explained that day-to-day hormone levels may be erratic and overall estrogen levels are lower despite random elevations--both of which contribute to menopause symptoms.  Explained that in perimenopause since body is still making its own estrogen, higher doses of estrogen are often needed to help balance out the erratic shifts and that this is often best accomplished with BRITTA over traditional HRT.  Explained benefits of both HRT and BRITTA to patient.  And if HRT chosen will try mid-high range at first and may need to increase.  Also reviewed that menses may continue or not with HRT and that only if heavy bleeding, prolonged bleeding or random spotting would need to be evaluated.      Pt feels menopasue vicious cycle is source of most issues  VMS--discussed lifestyle that makes hot flashes worse  --alcohol  (12% of breast cancers associated with alcohol intake)  --high sugar or high carb diet  --omega 3 fatty acids (if taking consider dc or take in am)  --weight gain  --poor sleep quality    Lifestyle changes that make VMS better  --good quality sleep  --yoga  --mediation  --CBT--with psych (Dr Romero)  --weight loss  --exercise (recommendation 250-300 min/wk moderately intense exercise) resistance exercise- has been shown to reduce severity of hot flashes   --acupuncture also reviewed as potential methods to ameliorate hot flash intensity but less data supporting  these  --Hypnosis also has huge impact on hot flash bother        Medical conditions that can cause VMS or exacerbate VMS  --DM, preDM, elevations in blood sugar  --DESMOND  --Thyroid conditions  --cardiac arrhythmias (palpitations specifically)    Medical conditions that are worsened by VMS  --menopause related palpitaitons associated with increase CVD  --dizziness associated with CVD  --All cause mortality assocated with palpitations, forgetfullness, dizziness, tremors, joint pain, feeling tired, cognitive changes        Tx options  --OCP--used more for perimenopause women b/c higher levels of E2 are needed to maintain an even level of E2 compared to postmenopause when the baseline E2 level is 0.  --HRT--see full risk/benefit counseling if further discussed  --SNRI (effexor) or SSRI-(paxil).  Paxil has established efficacy but higher weight gain, sexual dysfunction than effexor.  Works in approx 4-6 wks and has similar efficacy to CBT.  Effexor is approx 60% effective  --Oxybutynin is an antimuscarinic, anticholinergic therapy that   is used for the treatment of overactive bladder and urinary urge   incontinence. One prospective study and two randomized,   double-blind studies in postmenopausal women demonstrated that oxybutynin at doses ranging from 2.5 mg or 5 mg   twice daily up to 15 mg extended-release daily significantly improved moderate to severe VMS by 73%. Adverse events of oxybutynin   are usually dose-dependent and most commonly include a dry   mouth and urinary difficulties. Long-term use of anticholinergics may be associated with cognitive decline, particularly in   older persons.  -works well in approximately 2 wks    ---Veozah--neurokinin 3 antagonist.  45 mg daily with or without food   50-60% decrease om frequency of hot flashes, 10-15% decrease in intensity     --Stellate ganglion block--refer to Dr Gupta   ---Alternative therapies discussed. HERBAL'S-- Soy products./Black Kohosh and herbal  supplements--  Explained that Black cohosh does have estrogen like properties.  If pt has documented increase risk for breast Ca or estrogen sensitive cancer hen this herbal preparation was not recommended.  Also explained that FDA does not regulate the herbal supplements.  Dosing could be very different from pill to pill, box-to-box and no regulation as to how much is needed.  No studies on safety have reliably document safety. Also reviewed the placebo effect 40-50%. And reviewed the recent pharmacologist study that many up to 30% of OTC supplements do not even have the substance in the pills.    Pt opted for Hrt over OCP    Pt counseled regarding R/B/A of HRT including but not limited to the following benefits:  -- decreased osteoporosis risk by decreasing rate of bone loss,   -- decrease in colon ca, (With HRT)  -- improved skin elasticity/vag dryness.   --improved mentation, possible decreased assoc with alzheimers  following concerns/risks:  1)  Risk of cardiac event/stroke/DVT/PE assoc with ORAL HRT/ERTdiscussed .  Increase risk if pt has h/o of significant comorbic medical conditions-would not be appropriate therapy.  Hormone therapy (HT) remains the most effective treatment for vasomotor symptoms (VMS) and the genitourinary  syndrome of menopause (GSM) and has been shown to prevent bone loss and fracture. The risks of HT differ  depending on type, dose, duration of use, route of administration, timing of initiation, and whether a progestogen is  used. Treatment should be individualized to identify the most appropriate HT type, dose, formulation, route of  administration, and duration of use, using the best available evidence to maximize benefits and minimize risks, with  periodic reevaluation of the benefits and risks of continuing or discontinuing HT.  Forwomen aged younger than 60 years orwho arewithin 10 years ofmenopause onset and have no contraindications, the  benefit-risk ratio is most favorable for  treatment of bothersomeVMSand for those at elevated risk for bone loss or fracture.  For women who initiate HT more than 10 or 20 years from menopause onset or are aged 60 years or older, the benefit-risk  ratio appears less favorable because of the greater absolute risks of coronary heart disease, stroke, venous thromboembolism,  and dementia. Longer durations of therapy should be for documented indications such as persistentVMSor bone loss, with  shared decision making and periodic reevaluation. For bothersome GSM symptoms not relieved with over-the-counter  therapies andwithout indications foruse of systemic HT, low-dosevaginal estrogen therapy or other therapies are recommended.    The risk increases if HRT/ERT started after age 60 or >10 yrs after menopause . Baseline risk of significant thromobosis 1:1000 after age 45. Discussed modest increase in CVA /MI/DVT/PE in oral ERT (+/- progestin)  and that this is less if started closer to onset of menopause.      And that there has been no documented increase risk of thrombosis on transdermal E2 in 9 RCT and a metaanalysis.  Reviewed evidence for TDE2 over oral E2.  Need 1/10 of the dose because TD gets absorbed directly into the blood stream and no diminished GI absorbiton.   Also reviewed evidence of avoiding the 2nd pass effect with TDE2 which avoids increase in prothrombotic gene production and therefor reduces risk of thrombosis. In addition, if HRT is started close to menopause with in 5 yrs optimally (in general less than 60 yr old or within 10 yrs of menopause) HRT may slightly decrease your risk of CAD and MI mortality.    2) Risk of breast ca explained as increased risk of 1 additional woman out of 1000 annually when on HRT ([facility] study. BUT not with ERT alone, in fact there may be decrease in risk with oral ERT alone. Similar results have been seen with transdermal E2 as well.  From NAMS--  No large RCTs have assessed the effect of long duration of hormone  therapy use on the risk of breast cancer.The attributable risk of breast cancer in women from the [facility]I is less than one additional case of breast cancer diagnosed per 1,000 users annually,9 a risk slightly greater than that observed with one daily glass of wine, less than with two daily glasses, and similar to the risk reported with obesity and low physical activity.[phone] Compared with placebo or nonusers of hormone therapy, there appears to be no additive effect of hormone therapy with age or elevated personal breast cancer risk factors on breast cancer incidence.[phone] Although the relative risk of breast cancer associated with hormone therapy use is similar in women at average or high risk, the actual number of cases or attributable risk will be greater in women with an increased underlying risk.86  -- improved sexual desire/function.  Explained there is an association with improved desire, and overall sexual satisfaction with tx with HRT/ERT. This can be seen when estradiol levels are >30-50pg.  Improvment in syx vary between patients and effects of ERT/HRT may take 6-8 wks to improve  3)  Also discussed that r/b/a of continuing HRT/ERT would be assessed every yr and at the 5 yrs decision to cont HRT/ERT benefits must outweigh increase risk of breast cancer.   4)  Reviewed thatt current literature suggests that prometrium may not have same risk of breast cancer as medroxy progesterone acetate (MPA) but risks of prometrium compared to other progestins (progestogens) is still being determined. (Juan et al 2008--E3N cohort study 80K women)      Reviewed black box warning --that the evidence for this was based on oral estrogen not TD estrogen.    #)   Weight gain in menopause  -several things contribute to weight gain in menopause  Natural decrease in metabolism with age, levels off after 70's.     Reviewed the association between weight and VMS: weight loss associated with improvement in VMS and weight  gain associated with increase/worsening VMS      1) decrease in muscle mass--less muscle-> decreased metabolism-> weight gain  2) sleep disruption (from any thing night sweats/DESMOND/incontinence/snoring partner) -> increase cortisol->decrease metabolsim->weight gain   A) and increase craving for carbohydrates with sleep deprivation  3) menopause syx--cause fatigue and lack of energy/motivation to work out or eat healthy  4) life stressors--decrease metabolism (similar to increased cortisol with lack of sleep) and decrease motivation to exercise or lack of time  5) change in gut lois (bacteria) and this leads to reduces metabolism, decreases efficiency of food absorption and contributes to weight gain.  Probiotic recommendations given  Although there is NO data yet that says taking a probiotic would help--that does seem a logical thing to do.  6) viscious cycle of menopause syx contributing to weight gain  Night sweats-->tired-->fatigue--> no energy to exercise--> lack of sleep contributing to poor eating choices (craving carbs)    Estrogen is involved in metabolism and after menopause the body is deprived of estrogen so to give the body back estrogen that is gone it slows metabolism to put fat on the trunk--so that there is conversion of fat to estrogen (estrone)   Reviewed accumulation of internal adipose not subQ with menopause  Reviewed HRT studies have not been shown to increase weight but that women on HRT gain less weight than those not on HRT and have an eaiser time mntn weight    Reeivewed testosterone supplementation is NOT indicated for weight loss.      Menoweight loss  WEIGHT LOSS Diet, resistance exercise, healthy sleep, stress reduction  Most improvements noted with multi-disciplinary approach with nutrition/dietician, behavioral health, lifestyle interventions, weight management (may be candidate for medications, bariatric sx)        RECS: refer to Int Med-[facility]    #) -decreased sexual desire---   reviewed in depth the changes in sexual function that often accompany menopause--dyspareunia, decreased vaginal sensation, decresaed ability to achieve orgasm, decreased lubrication, decreased mental and physical arousal.  Reviewed the bio-psych-soc-relp model and did a cursory intake.  DIscussed the Basson model of desire and sexual function being more circular as opposed to linear., and that often in menopause women may start the sexual activity without desire and still be able to achieve sexual satisfaction.  Reviewed desire exercises--explained the concept of mentally or visually focusing on arousing or erotic images/thoughts to stimulate desire (positive CBT imagery).  Discussed the importance of open dialog with partner to improve intimacy and understanding of sexual difficulties and needs.  Discussed menopause tx options---replace systemic TDE2 (+/- P with uterus) and then add systemic T, (or give T with E2/P).  May also consider Flibanserin or Bremelanotide if no improvement.  Explained that all women are different in how their sex hormones impact their sexual function.  Similarly all women are different with how their neurochemistry impacts their sexual function.  While some women's desire is related to E2, other the T and other may be a combination of hormone levels and neurochemistry.  Thus there is no standard treatment approach and that shared decision making should be used to determine what patients goals are and treatment approach.  Referred pt to NAMS website for patients.      Reviewed impact of sexual interest from menopause syx.  Hot flashes/night sweats--> trouble sleeping--> fatigue--> irritablility--> (sometimes increase weight and body image issues)--> and if weight gain--> increase in hot flashes--> no interest in sex b/c hot/tired/and irritated and feel bad about body changing    Pt to see how she feels in 3 mo and then consider T    #) --reveiwed syx of GSM--vaginal irritation, itching,  dryness, d/c, discomfort, mild to sever dyspareunia, urinary frequency, urinary urgency, urinary irritation or sense of pelvic pressure.  syx can be externally and inside vagina.  Explained the process is result of lack of estrogen, and sometimes decreased androgens (testosterone) and that it is a progressive condition with aging.  This is a chronic and progressive condition, and if symptomatic will likely get worse without treatment.  Standard treatments age vaginal moisturizers (replens or coconut oil-internally and externally daily) and vaginal estrogens and other hormone treatments (prasterone/DHEA, Ospemiphene).  Vaginal estrogens come in a variety of preparations--cream, tablet, capsule, ring and that all but the ring is the same frequency of use depending on severity of presenting symptoms and duration.  Explained that with sexual pain, often need to use estrogen cream so the outside of the vagina (vulva) also gets sufficient amount of estrogen. Explained typical use with vaginal estrogen.  Instructions for vaginal estrogen: Use every night for 2 weeks and then twice a week. It is ok if doses are missed.  If several months go by without using, you may need to repeat the 2 weeks every night again.    Reviewed that despite the black box warning on the container--vaginal estrogen has never been shown to increase the risk of breast cancer.  This is a local product for the vagina and is only supplementing in a very low dose the estrogen that use to be made.  Besides allergic reaction to the cream or substrate that the estrogen is dissolved in or the mess, other side effects of breast tenderness, vaginal bleeding and nausea are uncommon.    Typically takes 6-8 weeks to note improvement.  Pt referred to The Menopause Society website for patient      Coconut oil--moisturizer    #) Menopause and fatigue  Major contributor is sleep deprivation. IF sleep is interrupted for any reason (VMS/snoring partner/pets/ noise)  this impacts energy.  Vicious cycle of VMS. Hot flashes/night sweats--> trouble sleeping--> irritablility---> fatigue and lack of motivation  Another major factor associated with fatigue is life stresses or perceived stress and mood disorders  Also, this is a time of developing medical condition and the need to evaluate for these  Vit D deficiency  Vit B12  Anemia  DESMOND  Thyroid disease  Chronic fatigue syndrome  Work with PCP for further evaluation    #)   #) Trouble sleeping--  Causes of poor sleep---fragmented sleep or interrupted sleep can be the result of being woken up by night sweats, or a results of other medical conditions (incontinence, diabetes, DESMOND) or social issues (partner who snores, dogs, noisy environment). Trouble sleeping can occur with menopause with or without night sweats.  Side effects of medications or alcohol    Consequences of poor sleep--    --increased CVD risk    --poor sleep quality associated with difficulty regulating weight because  ---fragmented sleep, or decreased REM cycles->increase cortisol (this is the stress hormone) ->this decreases metabolism->thus increasing weight gain  --Sleep is improtant for muscle metabolism/recovery--decrease muscle mass->decrease metabolism->increase weight    --sleep deprivation increases craving for carbohydrates                 Recommendations;  Goal is 7-9 hours  Reviewed importance of good sleep habits       -do not sleep with light on (should be dark and quiet)              -do not nap             - omega 3 FA associated with insomnia (if taking try to take them in am instead)   -Sleeping josue     Treatments  Refer to Dr Caceres        F/up 3 mo  I personally spent 45 minutes today minutes face-to-face with the patient reviewing her concerns and treatment options and reviewing patient notes (including recent notes from GYN, PCP and other pertinent specialists related to the diagnoses today). I reviewed pertinent labs in the  system and outside  facilities.     Discussed health maintenance including importance of daily exercise and diet modifications, monthly breast exams, avoidance of excessive alcohol and smoking, safe sex practices, and multivitamin/calcium supplementation.  Pt education and instruction regarding diagnosis, treatment, and follow up were discussed.  Pt understands the plan and all her questions answered.    Tish Quick MD         [1]   Patient Active Problem List  Diagnosis    Abnormal mammogram    Amenorrhea    Benign essential HTN    Blood pressure elevated without history of HTN    Fatigue    Female infertility    Ganglion cyst of wrist    Increased BMI (body mass index)    Low serum potassium    Metabolic syndrome    Pharyngitis, streptococcal    Thyroid enlarged    Vitamin D deficiency    Class 2 severe obesity with serious comorbidity and body mass index (BMI) of 35.0 to 35.9 in adult    Tenosynovitis, wrist   [2]   Past Medical History:  Diagnosis Date    Encounter for other general counseling and advice on procreation 2017    Pre-conception counseling    Encounter for preprocedural laboratory examination 2017    Pre-procedure lab exam    Other specified health status     Known health problems: none    Other symptoms and signs concerning food and fluid intake 2020    Increased BMI (body mass index)    Personal history of other diseases of the digestive system 2016    History of acute cholecystitis    Personal history of other diseases of the respiratory system 10/23/2018    History of sinusitis    Personal history of other drug therapy 10/23/2018    History of drug therapy   [3]   Past Surgical History:  Procedure Laterality Date     SECTION, CLASSIC      CYST REMOVAL      GALLBLADDER SURGERY      OTHER SURGICAL HISTORY  2015    Gynecologic Services In Vitro Fertilization    OTHER SURGICAL HISTORY  2016    Laparoscopic Cholecystectomy With Cholangiography    REFRACTIVE SURGERY   04/08/2014    Corneal LASIK   [4]   Patient Active Problem List  Diagnosis    Abnormal mammogram    Amenorrhea    Benign essential HTN    Blood pressure elevated without history of HTN    Fatigue    Female infertility    Ganglion cyst of wrist    Increased BMI (body mass index)    Low serum potassium    Metabolic syndrome    Pharyngitis, streptococcal    Thyroid enlarged    Vitamin D deficiency    Class 2 severe obesity with serious comorbidity and body mass index (BMI) of 35.0 to 35.9 in adult    Tenosynovitis, wrist   [5]   Current Outpatient Medications:     amLODIPine (Norvasc) 5 mg tablet, Take 1 tablet (5 mg) by mouth once daily., Disp: 90 tablet, Rfl: 3    biotin 10 mg tablet, Take by mouth. (Patient not taking: Reported on 1/30/2025), Disp: , Rfl:     cholecalciferol (Vitamin D-3) 25 MCG (1000 UT) tablet, Take by mouth. (Patient not taking: Reported on 1/30/2025), Disp: , Rfl:     mv-min/iron/folic/calcium/vitK (WOMEN'S MULTIVITAMIN ORAL), Take by mouth. (Patient not taking: Reported on 1/30/2025), Disp: , Rfl:     semaglutide, weight loss, (Wegovy) 0.25 mg/0.5 mL pen injector, Inject 0.25 mg under the skin 1 (one) time per week for 4 doses. (Patient not taking: Reported on 1/30/2025), Disp: 2 mL, Rfl: 0    zinc acetate 25 mg (zinc) capsule, Take by mouth. (Patient not taking: Reported on 1/30/2025), Disp: , Rfl:

## 2025-06-18 NOTE — PROGRESS NOTES
LMP 3 months ago  Hysterectomy no        REVISED MenQOL 2025 sm            COMPLAINT                                                                     Not at all bothered       1  2  3  4  5       Extremely Bothered      Hot flashes                                          No     Yes           à               1   2   3   4   5   Y4     Night sweats                                        No     Yes           à               1   2   3   4   5  Y4      Difficulty falling asleep                         No     Yes           à               1   2   3   4   5    Y2     Difficulty staying asleep                      No     Yes           à               1   2   3   4   5    Y4     Feeling anxious or nervous                 No     Yes           à               1   2   3   4   5    Y1     Experiencing poor memory                 No     Yes           à               1   2   3   4   5    Y3     Feeling depressed, down or blue        No     Yes           à               1   2   3   4   5    Y2     Being impatient with others                No     Yes           à               1   2   3   4   5    Y2     Aching muscles or joints                     No     Yes           à               1   2   3   4   5    Y2     Feeling tired or worn out                    No     Yes           à               1   2   3   4   5    Y3.     Feeling lack of energy                          No     Yes           à               1   2   3   4   5    Y3     Dry skin                                                No     Yes           à               1   2   3   4   5    Y2     Weight gain                                           No     Yes           à               1   2   3   4   5    Y4     Increased facial hair                             No     Yes           à               1   2   3   4   5    Y2     Changes to texture or tone of skin     No     Yes           à               1   2   3   4   5    Y2     Frequent urination                               "No     Yes           à               1   2   3   4   5    N     Leaking urine with coughing, laughing No     Yes           à               1   2   3   4   5   N     Unable to hold urine                            No     Yes           à               1   2   3   4   5    N     Vaginal dryness                                   No     Yes           à               1   2   3   4   5    Y3     Changes to sexual desire                    No     Yes           à               1   2   3   4   5    Y3     Discomfort or pain with intercourse   No     Yes           à               1   2   3   4   5    Y3     Constipation                                         No     Yes           à               1   2   3   4   5    Y1     Headache                                            No     Yes           à               1   2   3   4   5    Y1     Hair thinning on head                         No     Yes           à               1   2   3   4   5    Y2        Most bothersome? \"All of it\"  When did it start?     LMP 3 months ago  Hysterectomy No  Number of years of menopause symptoms 1 year.   Previous treatments/interventions No  Has used \"Khloe\" as a non prescription intervention.   Diet and Exercise: Cardio 45 minutes and walking intermittently  Patient does not have questions or concerns about:   Breast health/cancer  Osteoporosis  Heart disease     "

## 2025-06-27 ENCOUNTER — APPOINTMENT (OUTPATIENT)
Dept: OBSTETRICS AND GYNECOLOGY | Facility: CLINIC | Age: 45
End: 2025-06-27
Payer: COMMERCIAL

## 2025-07-14 ENCOUNTER — OFFICE VISIT (OUTPATIENT)
Dept: SLEEP MEDICINE | Facility: CLINIC | Age: 45
End: 2025-07-14
Payer: COMMERCIAL

## 2025-07-14 VITALS
SYSTOLIC BLOOD PRESSURE: 118 MMHG | DIASTOLIC BLOOD PRESSURE: 66 MMHG | BODY MASS INDEX: 39.16 KG/M2 | TEMPERATURE: 98.4 F | HEIGHT: 63 IN | HEART RATE: 82 BPM | OXYGEN SATURATION: 98 % | WEIGHT: 221 LBS

## 2025-07-14 DIAGNOSIS — E66.01 CLASS 2 SEVERE OBESITY WITH SERIOUS COMORBIDITY AND BODY MASS INDEX (BMI) OF 35.0 TO 35.9 IN ADULT, UNSPECIFIED OBESITY TYPE: ICD-10-CM

## 2025-07-14 DIAGNOSIS — G47.30 SLEEP APNEA, UNSPECIFIED TYPE: Primary | ICD-10-CM

## 2025-07-14 DIAGNOSIS — E66.812 CLASS 2 SEVERE OBESITY WITH SERIOUS COMORBIDITY AND BODY MASS INDEX (BMI) OF 35.0 TO 35.9 IN ADULT, UNSPECIFIED OBESITY TYPE: ICD-10-CM

## 2025-07-14 DIAGNOSIS — I10 BENIGN ESSENTIAL HTN: ICD-10-CM

## 2025-07-14 PROCEDURE — 3008F BODY MASS INDEX DOCD: CPT | Performed by: STUDENT IN AN ORGANIZED HEALTH CARE EDUCATION/TRAINING PROGRAM

## 2025-07-14 PROCEDURE — 99204 OFFICE O/P NEW MOD 45 MIN: CPT | Performed by: STUDENT IN AN ORGANIZED HEALTH CARE EDUCATION/TRAINING PROGRAM

## 2025-07-14 PROCEDURE — 3074F SYST BP LT 130 MM HG: CPT | Performed by: STUDENT IN AN ORGANIZED HEALTH CARE EDUCATION/TRAINING PROGRAM

## 2025-07-14 PROCEDURE — 1036F TOBACCO NON-USER: CPT | Performed by: STUDENT IN AN ORGANIZED HEALTH CARE EDUCATION/TRAINING PROGRAM

## 2025-07-14 PROCEDURE — 3078F DIAST BP <80 MM HG: CPT | Performed by: STUDENT IN AN ORGANIZED HEALTH CARE EDUCATION/TRAINING PROGRAM

## 2025-07-14 ASSESSMENT — SLEEP AND FATIGUE QUESTIONNAIRES
ESS-CHAD TOTAL SCORE: 7
HOW LIKELY ARE YOU TO NOD OFF OR FALL ASLEEP WHILE SITTING QUIETLY AFTER LUNCH WITHOUT ALCOHOL: WOULD NEVER DOZE
DIFFICULTY_STAYING_ASLEEP: VERY SEVERE
HOW LIKELY ARE YOU TO NOD OFF OR FALL ASLEEP WHILE SITTING AND READING: SLIGHT CHANCE OF DOZING
SATISFACTION_WITH_CURRENT_SLEEP_PATTERN: VERY SATISFIED
HOW LIKELY ARE YOU TO NOD OFF OR FALL ASLEEP WHILE LYING DOWN TO REST IN THE AFTERNOON WHEN CIRCUMSTANCES PERMIT: MODERATE CHANCE OF DOZING
WORRIED_DISTRESSED_DUE_TO_SLEEP: MUCH
WAKING_TOO_EARLY: VERY SEVERE
HOW LIKELY ARE YOU TO NOD OFF OR FALL ASLEEP IN A CAR, WHILE STOPPED FOR A FEW MINUTES IN TRAFFIC: WOULD NEVER DOZE
HOW LIKELY ARE YOU TO NOD OFF OR FALL ASLEEP WHILE WATCHING TV: MODERATE CHANCE OF DOZING
DIFFICULTY_FALLING_ASLEEP: MILD
SLEEP_PROBLEM_NOTICEABLE_TO_OTHERS: VERY MUCH NOTICEABLE
HOW LIKELY ARE YOU TO NOD OFF OR FALL ASLEEP WHILE SITTING AND TALKING TO SOMEONE: WOULD NEVER DOZE
HOW LIKELY ARE YOU TO NOD OFF OR FALL ASLEEP WHEN YOU ARE A PASSENGER IN A CAR FOR AN HOUR WITHOUT A BREAK: MODERATE CHANCE OF DOZING
SITING INACTIVE IN A PUBLIC PLACE LIKE A CLASS ROOM OR A MOVIE THEATER: WOULD NEVER DOZE
SLEEP_PROBLEM_INTERFERES_DAILY_ACTIVITIES: MUCH

## 2025-07-14 NOTE — PROGRESS NOTES
Patient: Soheila Hassan    [id number]  : 1980 -- AGE 44 y.o.    Provider: Jhoan Garcia MD     Location [facility]   Service Date: 2025              [facility]  New Visit Note     Assessment/Plan   Ms. Hassan is a 44 y.o. female and she returns in followup to the [facility] for the problems listed below on 25   Problem List, Orders, Assessment, Recommendations:  Diagnoses and all orders for this visit:  Sleep apnea, unspecified type  -     Home sleep apnea test (HSAT); Future  Benign essential HTN  Class 2 severe obesity with serious comorbidity and body mass index (BMI) of 35.0 to 35.9 in adult, unspecified obesity type    Assessment & Plan  Insomnia  Difficulty maintaining sleep, characterized by waking up around 2 AM and trouble returning to sleep, coinciding with perimenopause onset. No difficulty falling asleep initially. Likely exacerbated by perimenopausal symptoms such as night sweats.    Perimenopausal symptoms  Experiencing symptoms including night sweats for approximately eight months, contributing to sleep disturbances.    Suspected sleep apnea  Suspicion of sleep apnea as a contributing factor to insomnia, given symptoms and anatomical predisposition. Likelihood estimated at 50-60%.  - Order home sleep apnea test. The sleep lab will contact her to schedule a time to  the device. She should wear the device for at least six hours during sleep and return it the next day for data analysis.  - If sleep apnea is confirmed, CPAP may be offered as first-line treatment. CPAP requires patience and perseverance, and support will be provided to ensure successful adaptation.    HTN  BP Readings from Last 1 Encounters:   25 118/66     - doing well, asymptomatic  - discussed at length the impact of untreated DESMOND and BP control  - continue current management and follow-up with PCP        Disposition  Will call  with sleep study results and determine F/U plan          HISTORY OF PRESENT ILLNESS     The patient's referring provider is: No ref. provider found; Felipe Benson MD    HISTORY OF PRESENT ILLNESS   Soheila Hassan is a 44 y.o. female with history of Hypertension and Obesity presents to [facility] for a sleep medicine evaluation with concerns of New Patient Visit and Sleep Apnea (Can't stay sleep).     Past Sleep History  Patient has the following sleep-related diagnoses: none.     Current History    History of Present Illness  She has experienced difficulty maintaining sleep over the past year, coinciding with the onset of perimenopause approximately eight months ago. She falls asleep easily around 9:30 PM but consistently wakes up around 2:00 AM and struggles to return to sleep. She often resorts to watching TV downstairs to fall back asleep for about an hour before waking up for work at 4:00 AM.    She experiences night sweats, for which she has been prescribed medication, but continues to have disrupted sleep. No snoring is reported, but her daughter and  occasionally note that she 'breathes hard.'    Her work schedule is from 6:00 AM to 2:30 PM, requiring her to wake up at 4:00 AM. She finds it easier to take short naps in a chair rather than in bed. Attempts to adjust her sleep schedule by going to bed later have not improved her sleep duration.    She has tried melatonin without success. There is no known family history of sleep apnea, and she is an only child. Her father, a , has undergone a sleep study but has not disclosed the results.    Sleep schedule:      Weekdays / Work Days Weekends / Days Off   Bedtime 9:30 pm  same as weekdays   Sleep latency 30 min same as weekdays   Wake time 3 am  same as weekdays   Perceived Total sleep time  Average/day:  5 hours/day Same as weekdays   Naps Napping habit: Patient denies taking naps.     Preferred sleeping  position: sidelying    Sleep-related ROS:    Sleep Initiation: Patient: has no problems going to sleep  Sleep Maintenance: Sleep Maintenance: wakes up about 1 times per night and has prolonged awakenings  Problems staying asleep associated with: nocturia and no clear reason    Breathing during sleep: no symptoms of snoring or concerns of breathing at night    RLS screen:  - Patient does not have unusual sensations in their extremities that cause an urge to move them.     Daytime Symptoms  On awakening patient reports: no morning symptoms    Patient reports: excessively sleepy during the day  Patient denies: feeling sleepy when driving  Fatigue concerns: Patient denies feeling fatigue    ESS: 7  MERCEDES: 19  FOSQ: 33    Sleep Disorder Specific Review of Social History  Soheila does not have a family history of sleep disorder.   She  reports that she has never smoked. She has never used smokeless tobacco. She reports current alcohol use of about 2.0 standard drinks of alcohol per week. She reports that she does not use drugs. Soheila currently lives with her     Caffeine consumption: No  Alcohol consumption: Yes, Patient consumes alcohol only on special occasions.  Smoking: No  Marijuana: No    ALLERGIES AND MEDICATIONS   ALLERGIES  Allergies[1]    MEDICATIONS  Current Medications[2]      PAST HISTORY     PAST MEDICAL HISTORY  She  has a past medical history of Encounter for other general counseling and advice on procreation (07/20/2017), Encounter for preprocedural laboratory examination (09/14/2017), Other specified health status, Other symptoms and signs concerning food and fluid intake (09/30/2020), Personal history of other diseases of the digestive system (04/19/2016), Personal history of other diseases of the respiratory system (10/23/2018), and Personal history of other drug therapy (10/23/2018).    PAST SURGICAL HISTORY:  Surgical History[3]    FAMILY HISTORY  Family History[4]          PHYSICAL EXAM  "    VITAL SIGNS: /66 (BP Location: Right arm, Patient Position: Sitting, BP Cuff Size: Large adult)   Pulse 82   Temp 36.9 °C (98.4 °F) (Oral)   Ht 1.6 m (5' 3\")   Wt 100 kg (221 lb)   SpO2 98%   BMI 39.15 kg/m²      CURRENT WEIGHT:   Vitals:    07/14/25 1639   Weight: 100 kg (221 lb)     Body mass index is 39.15 kg/m².  PREVIOUS WEIGHTS:  Wt Readings from Last 3 Encounters:   07/14/25 100 kg (221 lb)   06/18/25 96.8 kg (213 lb 6.4 oz)   01/30/25 88 kg (194 lb 0.1 oz)       PHYSICAL EXAM: MODIFIED MALLAMPATI SCORE: III (soft and hard palate and base of uvula visible)  TONGUE SCALLOPING: with scalloping      RESULTS/DATA     Bicarbonate (mmol/L)   Date Value   03/15/2023 25   12/21/2022 28   03/11/2021 27     Iron (ug/dL)   Date Value   01/16/2018 82     Iron Saturation (%)   Date Value   01/16/2018 20 (L)     TIBC (ug/dL)   Date Value   01/16/2018 412     This medical note was created with the assistance of artificial intelligence (AI) for documentation purposes. The content has been reviewed and confirmed by the healthcare provider for accuracy and completeness. Patient consented to the use of audio recording and use of AI during their visit.          [1] No Known Allergies  [2]   Current Outpatient Medications   Medication Sig Dispense Refill    amLODIPine (Norvasc) 5 mg tablet Take 1 tablet (5 mg) by mouth once daily. 90 tablet 3    biotin 10 mg tablet Take by mouth. (Patient not taking: Reported on 1/30/2025)      cholecalciferol (Vitamin D-3) 25 MCG (1000 UT) tablet Take by mouth. (Patient not taking: Reported on 1/30/2025)      estradiol (Vivelle-DOT) 0.075 mg/24 hr patch Place 1 patch over 96 hours on the skin 2 times a week. 24 patch 3    mv-min/iron/folic/calcium/vitK (WOMEN'S MULTIVITAMIN ORAL) Take by mouth. (Patient not taking: Reported on 1/30/2025)      progesterone (Prometrium) 100 mg capsule Take 1 capsule (100 mg) by mouth once daily at bedtime. 90 capsule 3    semaglutide, weight loss, " (Wegovy) 0.25 mg/0.5 mL pen injector Inject 0.25 mg under the skin 1 (one) time per week for 4 doses. (Patient not taking: Reported on 2025) 2 mL 0    zinc acetate 25 mg (zinc) capsule Take by mouth. (Patient not taking: Reported on 2025)       No current facility-administered medications for this visit.   [3]   Past Surgical History:  Procedure Laterality Date     SECTION, CLASSIC      CYST REMOVAL      GALLBLADDER SURGERY      OTHER SURGICAL HISTORY  2015    Gynecologic Services In Vitro Fertilization    OTHER SURGICAL HISTORY  2016    Laparoscopic Cholecystectomy With Cholangiography    REFRACTIVE SURGERY  2014    Corneal LASIK   [4]   Family History  Problem Relation Name Age of Onset    Lung cancer Mother      COPD Father      Stroke Other grandparent     Cancer Mother Jessi Hawk 40 - 49

## 2025-08-11 ENCOUNTER — TELEPHONE (OUTPATIENT)
Dept: SLEEP MEDICINE | Facility: HOSPITAL | Age: 45
End: 2025-08-11
Payer: COMMERCIAL

## 2025-08-25 ENCOUNTER — APPOINTMENT (OUTPATIENT)
Dept: SLEEP MEDICINE | Facility: CLINIC | Age: 45
End: 2025-08-25
Payer: COMMERCIAL

## 2025-08-28 ENCOUNTER — TELEPHONE (OUTPATIENT)
Facility: CLINIC | Age: 45
End: 2025-08-28
Payer: COMMERCIAL

## 2025-08-29 ENCOUNTER — OFFICE VISIT (OUTPATIENT)
Facility: CLINIC | Age: 45
End: 2025-08-29
Payer: COMMERCIAL

## 2025-08-29 ENCOUNTER — HOSPITAL ENCOUNTER (OUTPATIENT)
Dept: RADIOLOGY | Facility: CLINIC | Age: 45
Discharge: HOME | End: 2025-08-29
Payer: COMMERCIAL

## 2025-08-29 VITALS — OXYGEN SATURATION: 100 % | HEART RATE: 89 BPM | WEIGHT: 207 LBS | BODY MASS INDEX: 36.67 KG/M2

## 2025-08-29 DIAGNOSIS — R05.1 ACUTE COUGH: ICD-10-CM

## 2025-08-29 DIAGNOSIS — I10 BENIGN ESSENTIAL HTN: ICD-10-CM

## 2025-08-29 DIAGNOSIS — R05.1 ACUTE COUGH: Primary | ICD-10-CM

## 2025-08-29 PROCEDURE — 99213 OFFICE O/P EST LOW 20 MIN: CPT | Performed by: INTERNAL MEDICINE

## 2025-08-29 PROCEDURE — 71046 X-RAY EXAM CHEST 2 VIEWS: CPT

## 2025-08-29 PROCEDURE — 1036F TOBACCO NON-USER: CPT | Performed by: INTERNAL MEDICINE

## 2025-08-29 RX ORDER — CHLORTHALIDONE 25 MG/1
25 TABLET ORAL DAILY
Qty: 30 TABLET | Refills: 0 | Status: SHIPPED | OUTPATIENT
Start: 2025-08-29 | End: 2025-09-28

## 2025-08-29 ASSESSMENT — PAIN SCALES - GENERAL: PAINLEVEL_OUTOF10: 0-NO PAIN

## 2025-09-17 ENCOUNTER — APPOINTMENT (OUTPATIENT)
Dept: SLEEP MEDICINE | Facility: CLINIC | Age: 45
End: 2025-09-17
Payer: COMMERCIAL